# Patient Record
Sex: FEMALE | Race: WHITE | URBAN - METROPOLITAN AREA
[De-identification: names, ages, dates, MRNs, and addresses within clinical notes are randomized per-mention and may not be internally consistent; named-entity substitution may affect disease eponyms.]

---

## 2022-07-26 ENCOUNTER — WEB ENCOUNTER (OUTPATIENT)
Dept: URBAN - METROPOLITAN AREA CLINIC 52 | Facility: CLINIC | Age: 57
End: 2022-07-26

## 2022-07-26 ENCOUNTER — LAB OUTSIDE AN ENCOUNTER (OUTPATIENT)
Dept: URBAN - METROPOLITAN AREA CLINIC 52 | Facility: CLINIC | Age: 57
End: 2022-07-26

## 2022-07-26 ENCOUNTER — DASHBOARD ENCOUNTERS (OUTPATIENT)
Age: 57
End: 2022-07-26

## 2022-07-26 ENCOUNTER — OFFICE VISIT (OUTPATIENT)
Dept: URBAN - METROPOLITAN AREA CLINIC 52 | Facility: CLINIC | Age: 57
End: 2022-07-26
Payer: COMMERCIAL

## 2022-07-26 ENCOUNTER — WEB ENCOUNTER (OUTPATIENT)
Dept: URBAN - METROPOLITAN AREA CLINIC 94 | Facility: CLINIC | Age: 57
End: 2022-07-26

## 2022-07-26 VITALS
WEIGHT: 150 LBS | SYSTOLIC BLOOD PRESSURE: 116 MMHG | HEIGHT: 59 IN | DIASTOLIC BLOOD PRESSURE: 74 MMHG | HEART RATE: 94 BPM | OXYGEN SATURATION: 98 % | BODY MASS INDEX: 30.24 KG/M2 | TEMPERATURE: 97.7 F

## 2022-07-26 DIAGNOSIS — R14.0 BLOATING SYMPTOM: ICD-10-CM

## 2022-07-26 DIAGNOSIS — Z12.11 SCREEN FOR COLON CANCER: ICD-10-CM

## 2022-07-26 PROCEDURE — 99203 OFFICE O/P NEW LOW 30 MIN: CPT | Performed by: INTERNAL MEDICINE

## 2022-07-26 RX ORDER — METHYLPREDNISOLONE 4 MG/1
TAKE 6 TABLETS ON DAY 1 AS DIRECTED ON PACKAGE AND DECREASE BY 1 TAB EACH DAY FOR A TOTAL OF 6 DAYS TABLET ORAL
Qty: 21 EACH | Refills: 0 | Status: DISCONTINUED | COMMUNITY

## 2022-07-26 RX ORDER — METAXALONE 800 MG/1
TAKE 1 TABLET BY MOUTH THREE TIMES A DAY TABLET ORAL
Qty: 90 EACH | Refills: 0 | Status: DISCONTINUED | COMMUNITY

## 2022-07-26 RX ORDER — OXYCODONE AND ACETAMINOPHEN 5; 325 MG/1; MG/1
TABLET ORAL
Qty: 24 TABLET | Status: DISCONTINUED | COMMUNITY

## 2022-07-26 RX ORDER — OXYBUTYNIN CHLORIDE 5 MG/1
TAKE 1 TABLET BY MOUTH EVERY EIGHT HOURS AS NEEDED FOR BLADDER SPASMS TABLET ORAL
Qty: 30 EACH | Refills: 0 | Status: DISCONTINUED | COMMUNITY

## 2022-07-26 RX ORDER — MELOXICAM 15 MG/1
TAKE 1 TABLET BY MOUTH EVERY DAY TABLET ORAL
Qty: 30 EACH | Refills: 0 | Status: DISCONTINUED | COMMUNITY

## 2022-07-26 NOTE — HPI-TODAY'S VISIT:
6 months bloating after eating Belly swells some Exac-? Allev-? Took trial of probiotic-may have helped No NV, Blood. Minor loose stool months Diet-?? Brother colon CA.  Never colonoscopy

## 2022-08-19 LAB
A/G RATIO: 1.4
ABSOLUTE BASOPHILS: 28
ABSOLUTE EOSINOPHILS: 216
ABSOLUTE LYMPHOCYTES: 3027
ABSOLUTE MONOCYTES: 564
ABSOLUTE NEUTROPHILS: 5565
ALBUMIN: 4.2
ALKALINE PHOSPHATASE: 93
ALT (SGPT): 10
AST (SGOT): 13
BASOPHILS: 0.3
BILIRUBIN, TOTAL: 0.3
BUN/CREATININE RATIO: (no result)
BUN: 9
CALCIUM: 9.5
CARBON DIOXIDE, TOTAL: 25
CHLORIDE: 104
CREATININE: 0.73
EGFR: 96
EOSINOPHILS: 2.3
GLOBULIN, TOTAL: 2.9
GLUCOSE: 68
HEMATOCRIT: 41.1
HEMOGLOBIN: 13.8
IMMUNOGLOBULIN A, QN, SERUM: 257
INTERPRETATION: (no result)
LYMPHOCYTES: 32.2
MCH: 29.5
MCHC: 33.6
MCV: 87.8
MONOCYTES: 6
MPV: 10.1
NEUTROPHILS: 59.2
PLATELET COUNT: 339
POTASSIUM: 3.8
PROTEIN, TOTAL: 7.1
RDW: 13.2
RED BLOOD CELL COUNT: 4.68
SODIUM: 139
T-TRANSGLUTAMINASE (TTG) IGA: <1
WHITE BLOOD CELL COUNT: 9.4

## 2022-08-24 ENCOUNTER — CLAIMS CREATED FROM THE CLAIM WINDOW (OUTPATIENT)
Dept: URBAN - METROPOLITAN AREA CLINIC 4 | Facility: CLINIC | Age: 57
End: 2022-08-24
Payer: COMMERCIAL

## 2022-08-24 ENCOUNTER — OFFICE VISIT (OUTPATIENT)
Dept: URBAN - METROPOLITAN AREA SURGERY CENTER 17 | Facility: SURGERY CENTER | Age: 57
End: 2022-08-24
Payer: COMMERCIAL

## 2022-08-24 DIAGNOSIS — D12.0 BENIGN NEOPLASM OF CECUM: ICD-10-CM

## 2022-08-24 DIAGNOSIS — Z12.11 COLON CANCER SCREENING: ICD-10-CM

## 2022-08-24 DIAGNOSIS — D12.3 ADENOMA OF TRANSVERSE COLON: ICD-10-CM

## 2022-08-24 DIAGNOSIS — D12.0 ADENOMA OF CECUM: ICD-10-CM

## 2022-08-24 DIAGNOSIS — Z80.0 BROTHER AT YOUNG AGE FAMILY HISTORY OF COLON CANCER: ICD-10-CM

## 2022-08-24 DIAGNOSIS — D12.3 BENIGN NEOPLASM OF TRANSVERSE COLON: ICD-10-CM

## 2022-08-24 PROCEDURE — G8907 PT DOC NO EVENTS ON DISCHARG: HCPCS | Performed by: INTERNAL MEDICINE

## 2022-08-24 PROCEDURE — 45385 COLONOSCOPY W/LESION REMOVAL: CPT | Performed by: INTERNAL MEDICINE

## 2022-08-24 PROCEDURE — 88305 TISSUE EXAM BY PATHOLOGIST: CPT | Performed by: PATHOLOGY

## 2022-09-07 ENCOUNTER — TELEPHONE ENCOUNTER (OUTPATIENT)
Dept: URBAN - METROPOLITAN AREA CLINIC 52 | Facility: CLINIC | Age: 57
End: 2022-09-07